# Patient Record
Sex: MALE | Race: WHITE | NOT HISPANIC OR LATINO | Employment: OTHER | RURAL
[De-identification: names, ages, dates, MRNs, and addresses within clinical notes are randomized per-mention and may not be internally consistent; named-entity substitution may affect disease eponyms.]

---

## 2017-05-12 ENCOUNTER — OFFICE VISIT (OUTPATIENT)
Dept: CARDIOLOGY | Facility: CLINIC | Age: 57
End: 2017-05-12

## 2017-05-12 VITALS
WEIGHT: 170 LBS | BODY MASS INDEX: 23.8 KG/M2 | HEART RATE: 59 BPM | HEIGHT: 71 IN | DIASTOLIC BLOOD PRESSURE: 100 MMHG | SYSTOLIC BLOOD PRESSURE: 140 MMHG

## 2017-05-12 DIAGNOSIS — R94.39 ABNORMAL STRESS TEST: Primary | ICD-10-CM

## 2017-05-12 DIAGNOSIS — M06.9 RHEUMATOID ARTHRITIS OF HAND, UNSPECIFIED LATERALITY, UNSPECIFIED RHEUMATOID FACTOR PRESENCE: ICD-10-CM

## 2017-05-12 DIAGNOSIS — R94.31 ABNORMAL EKG: ICD-10-CM

## 2017-05-12 PROBLEM — S05.72XA: Status: ACTIVE | Noted: 2017-05-12

## 2017-05-12 PROCEDURE — 99204 OFFICE O/P NEW MOD 45 MIN: CPT | Performed by: INTERNAL MEDICINE

## 2017-05-12 PROCEDURE — 93000 ELECTROCARDIOGRAM COMPLETE: CPT | Performed by: INTERNAL MEDICINE

## 2018-03-30 ENCOUNTER — OFFICE VISIT (OUTPATIENT)
Dept: CARDIOLOGY | Facility: CLINIC | Age: 58
End: 2018-03-30

## 2018-03-30 VITALS
BODY MASS INDEX: 21.14 KG/M2 | HEIGHT: 71 IN | DIASTOLIC BLOOD PRESSURE: 80 MMHG | HEART RATE: 75 BPM | SYSTOLIC BLOOD PRESSURE: 134 MMHG | WEIGHT: 151 LBS

## 2018-03-30 DIAGNOSIS — R94.31 ABNORMAL EKG: ICD-10-CM

## 2018-03-30 DIAGNOSIS — M06.9 RHEUMATOID ARTHRITIS OF HAND, UNSPECIFIED LATERALITY, UNSPECIFIED RHEUMATOID FACTOR PRESENCE: ICD-10-CM

## 2018-03-30 DIAGNOSIS — R55 NEAR SYNCOPE: Primary | ICD-10-CM

## 2018-03-30 DIAGNOSIS — S05.72XD: ICD-10-CM

## 2018-03-30 PROCEDURE — 93000 ELECTROCARDIOGRAM COMPLETE: CPT | Performed by: INTERNAL MEDICINE

## 2018-03-30 PROCEDURE — 99213 OFFICE O/P EST LOW 20 MIN: CPT | Performed by: INTERNAL MEDICINE

## 2018-03-30 NOTE — PROGRESS NOTES
Subjective:     Encounter Date:03/30/2018      Patient ID: Chris Finn Sr. is a 58 y.o. male.    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     I saw the patient initially in 5/2017 for evaluation of an abnormal stress echocardiogram and EKG.  The patient was planning on getting elective hernia repair of a left inguinal hernia at that time and underwent pre-operative testing including an EKG that was noted to be abnormal.  He subsequently underwent a stress echocardiogram that showed equivocal EKG findings (due to baseline abnormal EKG).  The echocardiographic images showed baseline normal left ventricular systolic function but was reported to have intermittent mid-septal dyskinesis.  With exercise, his left ventricular function did become hyperdynamic, but it appeared that the mid-septum became hypokinetic at peak exercise and heart rate.  The stress test was reported as equivocal with possible septal hypokinesis.  He was sent here for further workup based on the stress test results.  Patient reported no symptoms and had no significant risk factors including family history.  We decided to proceed with a nuclear stress test at that time.  This was performed on 5/19/2017 and showed no evidence of ischemia.  Patient was cleared for surgery which he underwent without any issues.    Patient reports that about a month ago due to his significant joint pain he was started on both Humira injections and up course of prednisone.  Following his initial heme air injection and a couple of doses of prednisone the patient began having symptoms of lightheadedness and near-syncope.  He reported that the symptoms actually improved with activity and exertion and worsened while he was at rest.  This is also associated with nausea.  He had no chest  pain, shortness of breath, palpitations, PND or orthopnea.  Symptoms at about 2 or 3 weeks to resolve.  He now has minimal symptoms.  He reports that he'd rather deal with his joint pain then not be able to function due to the above symptoms.  He has stopped all those medications.  He reports that his blood pressures usually do okay as long as he is not in pain.  He was placed on blood pressure medication at one point but reports that he is up stopping that medication also.       Review of Systems   Constitution: Negative for weakness and malaise/fatigue.   HENT: Negative for hearing loss, hoarse voice, nosebleeds and sore throat.    Eyes: Negative for pain.   Cardiovascular: Negative for chest pain, claudication, cyanosis, dyspnea on exertion, irregular heartbeat, leg swelling, orthopnea, palpitations, paroxysmal nocturnal dyspnea and syncope.   Respiratory: Negative for shortness of breath and snoring.    Endocrine: Negative for cold intolerance, heat intolerance, polydipsia, polyphagia and polyuria.   Skin: Negative for itching and rash.   Musculoskeletal: Positive for joint pain. Negative for arthritis, falls, joint swelling, muscle cramps, muscle weakness and myalgias.   Gastrointestinal: Positive for nausea. Negative for constipation, diarrhea, dysphagia, heartburn, hematemesis, hematochezia, melena and vomiting.   Genitourinary: Negative for frequency, hematuria and hesitancy.   Neurological: Positive for light-headedness. Negative for excessive daytime sleepiness, dizziness, headaches and numbness.   Psychiatric/Behavioral: Negative for depression. The patient is not nervous/anxious.           Current Outpatient Prescriptions:   •  oxyCODONE-acetaminophen (PERCOCET) 7.5-325 MG per tablet, Take 1 tablet by mouth Every 6 (Six) Hours As Needed., Disp: , Rfl:     Past Medical History:   Diagnosis Date   • GERD (gastroesophageal reflux disease)    • Hiatal hernia    • Left flank pain    • Osteoarthritis    •  "Renal calcification      Past Surgical History:   Procedure Laterality Date   • COLONOSCOPY     • ENDOSCOPY     • EYE SURGERY Left      Family History   Problem Relation Age of Onset   • Arthritis Mother    • Hypertension Mother    • Cancer Brother      Social History   Substance Use Topics   • Smoking status: Never Smoker   • Smokeless tobacco: Not on file   • Alcohol use 7.2 oz/week     12 Cans of beer per week      Comment: a week           ECG 12 Lead  Date/Time: 3/30/2018 5:20 PM  Performed by: HAYDER LUNDBERG  Authorized by: HAYDER LUNDBERG   Comparison: compared with previous ECG   Similar to previous ECG  Rhythm: sinus rhythm  Comments: Inferolateral ST-T wave changes               Objective:         Visit Vitals  /80   Pulse 75   Ht 180.3 cm (71\")   Wt 68.5 kg (151 lb)   BMI 21.06 kg/m²          Physical Exam   Constitutional: He is oriented to person, place, and time. He appears well-developed and well-nourished.   HENT:   Head: Normocephalic and atraumatic.   Neck: No JVD present. Carotid bruit is not present.   Cardiovascular: Normal rate, regular rhythm, S1 normal and S2 normal.  Exam reveals no gallop.    No murmur heard.  Pulses:       Radial pulses are 2+ on the right side, and 2+ on the left side.   No bilateral lower extremity edema   Pulmonary/Chest: Effort normal and breath sounds normal.   Abdominal: Soft. Normal appearance.   Neurological: He is alert and oriented to person, place, and time.   Skin: Skin is warm, dry and intact.   Psychiatric: He has a normal mood and affect.       Lab Review:       Assessment:          Diagnosis Plan   1. Near syncope     2. Abnormal EKG     3. Rheumatoid arthritis of hand, unspecified laterality, unspecified rheumatoid factor presence     4. Traumatic enucleation of left eye, subsequent encounter            Plan:       1.  Near syncope.  The timing and the resolution of the symptoms does make it seem like they're medication related.  Within the last " year he's had both an echocardiogram and a stress test that was essentially unremarkable except for some possible septal wall motion abnormalities noted on his echocardiogram.  However his nuclear medicine stress test was negative for ischemia.  Currently his symptoms have appeared to resolve.  If he did still have symptoms or if his symptoms recurred I would consider setting him up to wear a monitor.  However with his lack of symptoms at all consistent be very useful.  2.  Rheumatoid arthritis  3.  Intermittent elevated blood pressures.  His blood pressures look well controlled today.  Continue to monitor these closely.  4.  History of abnormal EKG.  EKG today appears stable compared to his prior tracings.    At this point would not recommend further workup unless his symptoms recur.  I have instructed the patient to call me if they do.

## 2018-12-12 ENCOUNTER — TELEPHONE (OUTPATIENT)
Dept: CARDIOLOGY | Facility: CLINIC | Age: 58
End: 2018-12-12

## 2018-12-12 NOTE — TELEPHONE ENCOUNTER
Received a request for surgical clearance before cervical spine surgery.  I called patient since it has been 8 months since his last evaluation.  He reports he is feeling well.  No chest pain, dyspnea, near syncope or syncope.  Had hernia surgery a few months ago without any issues.  He should be ok to proceed with surgery.  Will have form faxed to Dr. Welch office

## 2018-12-14 NOTE — TELEPHONE ENCOUNTER
Faxed copy of Echo, Stress, EKG, Last Office Visit note to Dr. Welch at [facility] at fax number [phone]. Thanks, Juanito

## 2024-10-08 ENCOUNTER — OFFICE VISIT (OUTPATIENT)
Dept: NEUROLOGY | Facility: CLINIC | Age: 64
End: 2024-10-08
Payer: MEDICARE

## 2024-10-08 ENCOUNTER — LAB (OUTPATIENT)
Dept: LAB | Facility: HOSPITAL | Age: 64
End: 2024-10-08
Payer: MEDICARE

## 2024-10-08 VITALS — HEART RATE: 73 BPM | BODY MASS INDEX: 21.79 KG/M2 | WEIGHT: 156.2 LBS | OXYGEN SATURATION: 96 %

## 2024-10-08 DIAGNOSIS — R06.09 DYSPNEA ON EXERTION: ICD-10-CM

## 2024-10-08 DIAGNOSIS — R06.09 DYSPNEA ON EXERTION: Primary | ICD-10-CM

## 2024-10-08 PROCEDURE — 86041 ACETYLCHOLN RCPTR BNDNG ANTB: CPT

## 2024-10-08 PROCEDURE — 86043 ACETYLCHOLN RCPTR MODLG ANTB: CPT

## 2024-10-08 PROCEDURE — 86042 ACETYLCHOLN RCPTR BLCKG ANTB: CPT

## 2024-10-08 PROCEDURE — 36415 COLL VENOUS BLD VENIPUNCTURE: CPT

## 2024-10-08 RX ORDER — HYDROCODONE BITARTRATE AND ACETAMINOPHEN 10; 325 MG/1; MG/1
TABLET ORAL
COMMUNITY
Start: 2024-09-30

## 2024-10-08 RX ORDER — LOSARTAN POTASSIUM 100 MG/1
TABLET ORAL
COMMUNITY
Start: 2024-09-20

## 2024-10-08 RX ORDER — TRAZODONE HYDROCHLORIDE 100 MG/1
100 TABLET ORAL EVERY EVENING
COMMUNITY
Start: 2024-09-30

## 2024-10-08 RX ORDER — PANTOPRAZOLE SODIUM 40 MG/1
1 TABLET, DELAYED RELEASE ORAL EVERY 12 HOURS SCHEDULED
COMMUNITY
Start: 2024-09-04

## 2024-10-08 RX ORDER — WARFARIN SODIUM 5 MG/1
5 TABLET ORAL DAILY
COMMUNITY
Start: 2024-09-22

## 2024-10-08 RX ORDER — ESZOPICLONE 3 MG/1
3 TABLET, FILM COATED ORAL EVERY EVENING
COMMUNITY
Start: 2024-09-17

## 2024-10-08 NOTE — PROGRESS NOTES
Notes by MA:  Pt is here today as a referral from Dr Diamond. His fiance, Ashley, accompanies him today. He verifies consent that we can speak with her regarding his medical care.      Subjective:     Dear Dr. Diamond, thank you for referring Mr. Finn for neurological consultation.  As you know, he is a 64-year-old gentleman with 6 to 7 months of shortness of air with exertion as well as bilateral upper extremity weakness and numbness that he says began abruptly.  He dates this to March 12 after his second cervical surgery.  He could not lift his arms after surgery and has been practicing and feels like he has a little bit more strength but nowhere near his normal.  He feels like his lower extremity power and balance is unremarkable.  He denies any incontinence.  He does endorse impotence.  His postop MRI dated March 15, 2024 revealed no significant evidence of central canal stenosis and no obvious areas of neural foraminal stenosis but obviously the study was compromised by zulay metallic artifact.    Patient ID: Chris Finn is a 64 y.o. male.    History of Present Illness  The following portions of the patient's history were reviewed and updated as appropriate: allergies, current medications, past family history, past medical history, past social history, past surgical history, and problem list.    Review of Systems   Constitutional:  Positive for activity change, appetite change and fatigue.   HENT:  Positive for hearing loss and trouble swallowing. Negative for facial swelling and voice change.    Eyes:  Negative for photophobia, pain and visual disturbance.   Respiratory:  Positive for cough, choking and shortness of breath. Negative for chest tightness and wheezing.    Cardiovascular:  Negative for chest pain, palpitations and leg swelling.   Gastrointestinal:  Negative for abdominal pain, nausea and vomiting.   Endocrine: Positive for cold intolerance and heat intolerance.   Musculoskeletal:  Positive for back  pain, joint swelling, neck pain and neck stiffness. Negative for arthralgias, gait problem and myalgias.   Neurological:  Positive for numbness. Negative for dizziness, tremors, seizures, syncope, facial asymmetry, speech difficulty, weakness, light-headedness and headaches.   Hematological:  Bruises/bleeds easily.   Psychiatric/Behavioral:  Positive for confusion, decreased concentration and sleep disturbance. Negative for agitation, behavioral problems, dysphoric mood, hallucinations, self-injury and suicidal ideas. The patient is nervous/anxious. The patient is not hyperactive.         Objective:    Neurologic Exam  Awake alert pleasant cooperative and oriented in all spheres.  Preserved cognitive function.  Voice becomes weak after speaking for a while.  He seems to run out of air quickly.    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    The motor exam is notable for prominent C5-6 weakness in both upper extremities fairly symmetrically primarily involving elbow flexion and upper arm abduction.  No significant weakness, atrophy or spastic features in the lower extremities.    Sensory exam reveals patchy decrease in temperature and light touch sensation in the left hand.    Coordination testing is limited by his proximal upper extremity weakness but he does not show any clear signs of dysdiadochokinesis, and he walks on a slightly widened base but with good stride length and no spastic features.    Tendon reflexes are absent at biceps and brachioradialis and trace at the triceps bilaterally.  There are 2+ at the knees and 2+ at the ankles with equivocal plantar signs bilaterally and no ankle clonus.  Physical Exam  No cervical bruits.  Cardiac rhythm is regular.  Assessment/Plan:     Diagnoses and all orders for this visit:    1. Dyspnea on exertion (Primary)  -     Acetylcholine Receptor Antibody  Panel; Future     64-year-old gentleman with approximately 6 months of shortness of breath and bilateral upper extremity weakness in a pattern potentially suggestive of mid cervical radiculopathies (C4-5).  He does not have long track features and there was no significant canal compromise on his post op cervical MRI to strongly suggest myelopathy.  Myasthenia is an unlikely etiology in this particular case but given that that is a treatable disorder we are moving ahead with a battery of acetylcholine receptor antibodies.  We discussed all the above including expected clinical outcomes and I will review his laboratory workup as it progresses and take any further measures that may be necessary.  Thank you for the opportunity to participate in the care of this delightful gentleman.    65 minutes total patient care time including extensive record review, examination, discussion and counseling, , and documentation.

## 2024-10-14 ENCOUNTER — TELEPHONE (OUTPATIENT)
Dept: NEUROLOGY | Facility: CLINIC | Age: 64
End: 2024-10-14
Payer: MEDICARE

## 2024-10-14 NOTE — TELEPHONE ENCOUNTER
Caller: TAMAR ZENDEJAS SIG OTHER . PT GAVE PERMISSION TO SPK TO HER     Best call back number: [phone]     What was the call regarding: PT IS HAVING PAIN IN BOTH UPPER ARMS SAYS FEELS LIKE SOMEONE PULLING HIS NERVES OUT OF HIS SKIN . IS THERE ANYTHING HE CAN TAKE FOR THIS?     Is it okay if the provider responds through MyChart: CALL    PLEASE ADVISE.

## 2024-10-15 LAB
ACHR BIND AB SER-SCNC: <0.03 NMOL/L (ref 0–0.24)
ACHR BLOCK AB SER-ACNC: 19 % (ref 0–25)
ACHR MOD AB SER QL FC: 4 % (ref 0–45)

## 2024-10-15 RX ORDER — PREGABALIN 50 MG/1
50 CAPSULE ORAL 2 TIMES DAILY
Qty: 60 CAPSULE | Refills: 2 | Status: SHIPPED | OUTPATIENT
Start: 2024-10-15

## 2024-10-18 ENCOUNTER — TELEPHONE (OUTPATIENT)
Dept: NEUROLOGY | Facility: CLINIC | Age: 64
End: 2024-10-18
Payer: MEDICARE

## 2024-10-18 NOTE — TELEPHONE ENCOUNTER
Acetylcholine receptor antibody blood test is negative.  This helps to exclude the possibility of myasthenia gravis. V/u

## 2024-10-18 NOTE — TELEPHONE ENCOUNTER
----- Message from Tito Rainey sent at 10/16/2024 11:28 AM EDT -----  Acetylcholine receptor antibody blood test is negative.  This helps to exclude the possibility of myasthenia gravis.